# Patient Record
Sex: FEMALE | Race: WHITE | URBAN - METROPOLITAN AREA
[De-identification: names, ages, dates, MRNs, and addresses within clinical notes are randomized per-mention and may not be internally consistent; named-entity substitution may affect disease eponyms.]

---

## 2017-02-13 ENCOUNTER — INPATIENT (INPATIENT)
Facility: HOSPITAL | Age: 57
LOS: 0 days | Discharge: ANOTHER HOSPITAL | End: 2017-02-14
Attending: INTERNAL MEDICINE | Admitting: INTERNAL MEDICINE
Payer: MEDICAID

## 2017-02-13 VITALS
DIASTOLIC BLOOD PRESSURE: 112 MMHG | OXYGEN SATURATION: 99 % | WEIGHT: 79.81 LBS | HEIGHT: 63.78 IN | RESPIRATION RATE: 17 BRPM | SYSTOLIC BLOOD PRESSURE: 227 MMHG | TEMPERATURE: 99 F | HEART RATE: 78 BPM

## 2017-02-13 LAB
ALBUMIN SERPL ELPH-MCNC: 3.8 G/DL — SIGNIFICANT CHANGE UP (ref 3.3–5)
ALP SERPL-CCNC: 166 U/L — HIGH (ref 40–120)
ALT FLD-CCNC: 78 U/L — SIGNIFICANT CHANGE UP (ref 12–78)
ANION GAP SERPL CALC-SCNC: 10 MMOL/L — SIGNIFICANT CHANGE UP (ref 5–17)
APTT BLD: 31.3 SEC — SIGNIFICANT CHANGE UP (ref 27.5–37.4)
AST SERPL-CCNC: 38 U/L — HIGH (ref 15–37)
BASOPHILS # BLD AUTO: 0.1 K/UL — SIGNIFICANT CHANGE UP (ref 0–0.2)
BASOPHILS NFR BLD AUTO: 2.2 % — HIGH (ref 0–2)
BILIRUB SERPL-MCNC: 0.6 MG/DL — SIGNIFICANT CHANGE UP (ref 0.2–1.2)
BUN SERPL-MCNC: 11 MG/DL — SIGNIFICANT CHANGE UP (ref 7–23)
CALCIUM SERPL-MCNC: 8.4 MG/DL — LOW (ref 8.5–10.1)
CHLORIDE SERPL-SCNC: 106 MMOL/L — SIGNIFICANT CHANGE UP (ref 96–108)
CK MB BLD-MCNC: 2.1 % — SIGNIFICANT CHANGE UP (ref 0–3.5)
CK MB BLD-MCNC: 2.2 % — SIGNIFICANT CHANGE UP (ref 0–3.5)
CK MB CFR SERPL CALC: 3.1 NG/ML — SIGNIFICANT CHANGE UP (ref 0.5–3.6)
CK MB CFR SERPL CALC: 3.9 NG/ML — HIGH (ref 0.5–3.6)
CK SERPL-CCNC: 142 U/L — SIGNIFICANT CHANGE UP (ref 26–192)
CK SERPL-CCNC: 184 U/L — SIGNIFICANT CHANGE UP (ref 26–192)
CO2 SERPL-SCNC: 24 MMOL/L — SIGNIFICANT CHANGE UP (ref 22–31)
CREAT SERPL-MCNC: 0.65 MG/DL — SIGNIFICANT CHANGE UP (ref 0.5–1.3)
D DIMER BLD IA.RAPID-MCNC: <150 NG/ML DDU — SIGNIFICANT CHANGE UP
EOSINOPHIL # BLD AUTO: 0.1 K/UL — SIGNIFICANT CHANGE UP (ref 0–0.5)
EOSINOPHIL NFR BLD AUTO: 1.5 % — SIGNIFICANT CHANGE UP (ref 0–6)
GLUCOSE SERPL-MCNC: 120 MG/DL — HIGH (ref 70–99)
HCT VFR BLD CALC: 43.8 % — SIGNIFICANT CHANGE UP (ref 34.5–45)
HGB BLD-MCNC: 15.5 G/DL — SIGNIFICANT CHANGE UP (ref 11.5–15.5)
INR BLD: 1.06 RATIO — SIGNIFICANT CHANGE UP (ref 0.88–1.16)
LIDOCAIN IGE QN: 152 U/L — SIGNIFICANT CHANGE UP (ref 73–393)
LYMPHOCYTES # BLD AUTO: 1.3 K/UL — SIGNIFICANT CHANGE UP (ref 1–3.3)
LYMPHOCYTES # BLD AUTO: 19.4 % — SIGNIFICANT CHANGE UP (ref 13–44)
MCHC RBC-ENTMCNC: 29.7 PG — SIGNIFICANT CHANGE UP (ref 27–34)
MCHC RBC-ENTMCNC: 35.4 GM/DL — SIGNIFICANT CHANGE UP (ref 32–36)
MCV RBC AUTO: 83.7 FL — SIGNIFICANT CHANGE UP (ref 80–100)
MONOCYTES # BLD AUTO: 0.5 K/UL — SIGNIFICANT CHANGE UP (ref 0–0.9)
MONOCYTES NFR BLD AUTO: 7.3 % — SIGNIFICANT CHANGE UP (ref 2–14)
NEUTROPHILS # BLD AUTO: 4.7 K/UL — SIGNIFICANT CHANGE UP (ref 1.8–7.4)
NEUTROPHILS NFR BLD AUTO: 69.7 % — SIGNIFICANT CHANGE UP (ref 43–77)
PLATELET # BLD AUTO: 386 K/UL — SIGNIFICANT CHANGE UP (ref 150–400)
POTASSIUM SERPL-MCNC: 3.6 MMOL/L — SIGNIFICANT CHANGE UP (ref 3.5–5.3)
POTASSIUM SERPL-SCNC: 3.6 MMOL/L — SIGNIFICANT CHANGE UP (ref 3.5–5.3)
PROT SERPL-MCNC: 8 GM/DL — SIGNIFICANT CHANGE UP (ref 6–8.3)
PROTHROM AB SERPL-ACNC: 11.9 SEC — SIGNIFICANT CHANGE UP (ref 10–13.1)
RBC # BLD: 5.24 M/UL — HIGH (ref 3.8–5.2)
RBC # FLD: 11.8 % — SIGNIFICANT CHANGE UP (ref 11–15)
SODIUM SERPL-SCNC: 140 MMOL/L — SIGNIFICANT CHANGE UP (ref 135–145)
TROPONIN I SERPL-MCNC: 0.17 NG/ML — HIGH (ref 0.01–0.04)
TROPONIN I SERPL-MCNC: 0.26 NG/ML — HIGH (ref 0.01–0.04)
WBC # BLD: 6.7 K/UL — SIGNIFICANT CHANGE UP (ref 3.8–10.5)
WBC # FLD AUTO: 6.7 K/UL — SIGNIFICANT CHANGE UP (ref 3.8–10.5)

## 2017-02-13 PROCEDURE — 99223 1ST HOSP IP/OBS HIGH 75: CPT

## 2017-02-13 PROCEDURE — 99284 EMERGENCY DEPT VISIT MOD MDM: CPT

## 2017-02-13 PROCEDURE — 71010: CPT | Mod: 26

## 2017-02-13 RX ORDER — SODIUM CHLORIDE 9 MG/ML
3 INJECTION INTRAMUSCULAR; INTRAVENOUS; SUBCUTANEOUS ONCE
Qty: 0 | Refills: 0 | Status: COMPLETED | OUTPATIENT
Start: 2017-02-13 | End: 2017-02-13

## 2017-02-13 RX ORDER — HEPARIN SODIUM 5000 [USP'U]/ML
2700 INJECTION INTRAVENOUS; SUBCUTANEOUS EVERY 6 HOURS
Qty: 0 | Refills: 0 | Status: DISCONTINUED | OUTPATIENT
Start: 2017-02-13 | End: 2017-02-14

## 2017-02-13 RX ORDER — CLOPIDOGREL BISULFATE 75 MG/1
300 TABLET, FILM COATED ORAL ONCE
Qty: 0 | Refills: 0 | Status: COMPLETED | OUTPATIENT
Start: 2017-02-13 | End: 2017-02-13

## 2017-02-13 RX ORDER — ASPIRIN/CALCIUM CARB/MAGNESIUM 324 MG
325 TABLET ORAL ONCE
Qty: 0 | Refills: 0 | Status: COMPLETED | OUTPATIENT
Start: 2017-02-13 | End: 2017-02-13

## 2017-02-13 RX ORDER — HEPARIN SODIUM 5000 [USP'U]/ML
INJECTION INTRAVENOUS; SUBCUTANEOUS
Qty: 25000 | Refills: 0 | Status: DISCONTINUED | OUTPATIENT
Start: 2017-02-13 | End: 2017-02-14

## 2017-02-13 RX ORDER — HEPARIN SODIUM 5000 [USP'U]/ML
2700 INJECTION INTRAVENOUS; SUBCUTANEOUS ONCE
Qty: 0 | Refills: 0 | Status: COMPLETED | OUTPATIENT
Start: 2017-02-13 | End: 2017-02-13

## 2017-02-13 RX ADMIN — CLOPIDOGREL BISULFATE 300 MILLIGRAM(S): 75 TABLET, FILM COATED ORAL at 21:54

## 2017-02-13 RX ADMIN — HEPARIN SODIUM 500 UNIT(S)/HR: 5000 INJECTION INTRAVENOUS; SUBCUTANEOUS at 21:54

## 2017-02-13 RX ADMIN — Medication 0.1 MILLIGRAM(S): at 14:13

## 2017-02-13 RX ADMIN — SODIUM CHLORIDE 3 MILLILITER(S): 9 INJECTION INTRAMUSCULAR; INTRAVENOUS; SUBCUTANEOUS at 13:23

## 2017-02-13 RX ADMIN — HEPARIN SODIUM 2700 UNIT(S): 5000 INJECTION INTRAVENOUS; SUBCUTANEOUS at 21:54

## 2017-02-13 RX ADMIN — Medication 325 MILLIGRAM(S): at 13:25

## 2017-02-13 NOTE — ED PROVIDER NOTE - PROGRESS NOTE DETAILS
Pt endorsed by Dr. Lund present for eval of HTN & CP.  EKG remarkable for inv lateral leads, +trop 0.26.  Dr. Lund contacted [facility], pending call back for possible transfer.  Pt to receive heparin, ASA, plavix. Discussed w [facility], pt to be transferred in am for cath.  Will admit for further eval/mgmt until then. Discussed w Dr. Calixto, will handle transfer & cath.

## 2017-02-13 NOTE — ED PROVIDER NOTE - OBJECTIVE STATEMENT
palpitations and chest discomfort 1 day palpitations and chest discomfort 1 day,has not taken BP meds in few months

## 2017-02-13 NOTE — ED ADULT TRIAGE NOTE - CHIEF COMPLAINT QUOTE
sent from urgent care for hypertensive crisis, dizziness with vision changes  original went to urgent cramps for lower abdominal cramps.

## 2017-02-14 ENCOUNTER — INPATIENT (INPATIENT)
Facility: HOSPITAL | Age: 57
LOS: 0 days | Discharge: HOME | DRG: 282 | End: 2017-02-14
Attending: SPECIALIST | Admitting: SPECIALIST
Payer: MEDICAID

## 2017-02-14 VITALS
WEIGHT: 169.54 LBS | DIASTOLIC BLOOD PRESSURE: 94 MMHG | OXYGEN SATURATION: 98 % | RESPIRATION RATE: 18 BRPM | HEART RATE: 68 BPM | SYSTOLIC BLOOD PRESSURE: 178 MMHG | TEMPERATURE: 98 F | HEIGHT: 59.06 IN

## 2017-02-14 VITALS
DIASTOLIC BLOOD PRESSURE: 78 MMHG | TEMPERATURE: 99 F | OXYGEN SATURATION: 99 % | HEART RATE: 74 BPM | RESPIRATION RATE: 18 BRPM | SYSTOLIC BLOOD PRESSURE: 128 MMHG

## 2017-02-14 VITALS
TEMPERATURE: 98 F | RESPIRATION RATE: 17 BRPM | OXYGEN SATURATION: 99 % | HEART RATE: 65 BPM | DIASTOLIC BLOOD PRESSURE: 62 MMHG | SYSTOLIC BLOOD PRESSURE: 168 MMHG

## 2017-02-14 DIAGNOSIS — I10 ESSENTIAL (PRIMARY) HYPERTENSION: ICD-10-CM

## 2017-02-14 DIAGNOSIS — I21.4 NON-ST ELEVATION (NSTEMI) MYOCARDIAL INFARCTION: ICD-10-CM

## 2017-02-14 DIAGNOSIS — I20.9 ANGINA PECTORIS, UNSPECIFIED: ICD-10-CM

## 2017-02-14 LAB
ANION GAP SERPL CALC-SCNC: 10 MMOL/L — SIGNIFICANT CHANGE UP (ref 5–17)
APTT BLD: 34.3 SEC — SIGNIFICANT CHANGE UP (ref 27.5–37.4)
APTT BLD: 42.3 SEC — HIGH (ref 27.5–37.4)
BASOPHILS # BLD AUTO: 0.1 K/UL — SIGNIFICANT CHANGE UP (ref 0–0.2)
BASOPHILS NFR BLD AUTO: 1.4 % — SIGNIFICANT CHANGE UP (ref 0–2)
BUN SERPL-MCNC: 15 MG/DL — SIGNIFICANT CHANGE UP (ref 7–23)
CALCIUM SERPL-MCNC: 8.7 MG/DL — SIGNIFICANT CHANGE UP (ref 8.5–10.1)
CHLORIDE SERPL-SCNC: 108 MMOL/L — SIGNIFICANT CHANGE UP (ref 96–108)
CO2 SERPL-SCNC: 24 MMOL/L — SIGNIFICANT CHANGE UP (ref 22–31)
CREAT SERPL-MCNC: 0.68 MG/DL — SIGNIFICANT CHANGE UP (ref 0.5–1.3)
EOSINOPHIL # BLD AUTO: 0.2 K/UL — SIGNIFICANT CHANGE UP (ref 0–0.5)
EOSINOPHIL NFR BLD AUTO: 1.8 % — SIGNIFICANT CHANGE UP (ref 0–6)
GLUCOSE SERPL-MCNC: 107 MG/DL — HIGH (ref 70–99)
HCT VFR BLD CALC: 43.4 % — SIGNIFICANT CHANGE UP (ref 34.5–45)
HCT VFR BLD CALC: 43.6 % — SIGNIFICANT CHANGE UP (ref 34.5–45)
HGB BLD-MCNC: 15.1 G/DL — SIGNIFICANT CHANGE UP (ref 11.5–15.5)
HGB BLD-MCNC: 15.4 G/DL — SIGNIFICANT CHANGE UP (ref 11.5–15.5)
LYMPHOCYTES # BLD AUTO: 1.9 K/UL — SIGNIFICANT CHANGE UP (ref 1–3.3)
LYMPHOCYTES # BLD AUTO: 23.1 % — SIGNIFICANT CHANGE UP (ref 13–44)
MCHC RBC-ENTMCNC: 29.9 PG — SIGNIFICANT CHANGE UP (ref 27–34)
MCHC RBC-ENTMCNC: 30.5 PG — SIGNIFICANT CHANGE UP (ref 27–34)
MCHC RBC-ENTMCNC: 34.8 GM/DL — SIGNIFICANT CHANGE UP (ref 32–36)
MCHC RBC-ENTMCNC: 35.3 GM/DL — SIGNIFICANT CHANGE UP (ref 32–36)
MCV RBC AUTO: 85.9 FL — SIGNIFICANT CHANGE UP (ref 80–100)
MCV RBC AUTO: 86.4 FL — SIGNIFICANT CHANGE UP (ref 80–100)
MONOCYTES # BLD AUTO: 0.6 K/UL — SIGNIFICANT CHANGE UP (ref 0–0.9)
MONOCYTES NFR BLD AUTO: 7.5 % — SIGNIFICANT CHANGE UP (ref 2–14)
NEUTROPHILS # BLD AUTO: 5.6 K/UL — SIGNIFICANT CHANGE UP (ref 1.8–7.4)
NEUTROPHILS NFR BLD AUTO: 66.2 % — SIGNIFICANT CHANGE UP (ref 43–77)
PLATELET # BLD AUTO: 338 K/UL — SIGNIFICANT CHANGE UP (ref 150–400)
PLATELET # BLD AUTO: 354 K/UL — SIGNIFICANT CHANGE UP (ref 150–400)
POTASSIUM SERPL-MCNC: 3.7 MMOL/L — SIGNIFICANT CHANGE UP (ref 3.5–5.3)
POTASSIUM SERPL-SCNC: 3.7 MMOL/L — SIGNIFICANT CHANGE UP (ref 3.5–5.3)
RBC # BLD: 5.04 M/UL — SIGNIFICANT CHANGE UP (ref 3.8–5.2)
RBC # BLD: 5.05 M/UL — SIGNIFICANT CHANGE UP (ref 3.8–5.2)
RBC # FLD: 12.4 % — SIGNIFICANT CHANGE UP (ref 11–15)
RBC # FLD: 12.4 % — SIGNIFICANT CHANGE UP (ref 11–15)
SODIUM SERPL-SCNC: 142 MMOL/L — SIGNIFICANT CHANGE UP (ref 135–145)
TROPONIN I SERPL-MCNC: 0.1 NG/ML — HIGH (ref 0.01–0.04)
TROPONIN I SERPL-MCNC: 0.14 NG/ML — HIGH (ref 0.01–0.04)
WBC # BLD: 7.7 K/UL — SIGNIFICANT CHANGE UP (ref 3.8–10.5)
WBC # BLD: 8.4 K/UL — SIGNIFICANT CHANGE UP (ref 3.8–10.5)
WBC # FLD AUTO: 7.7 K/UL — SIGNIFICANT CHANGE UP (ref 3.8–10.5)
WBC # FLD AUTO: 8.4 K/UL — SIGNIFICANT CHANGE UP (ref 3.8–10.5)

## 2017-02-14 PROCEDURE — 93010 ELECTROCARDIOGRAM REPORT: CPT

## 2017-02-14 RX ORDER — ASPIRIN/CALCIUM CARB/MAGNESIUM 324 MG
81 TABLET ORAL DAILY
Qty: 0 | Refills: 0 | Status: DISCONTINUED | OUTPATIENT
Start: 2017-02-14 | End: 2017-02-14

## 2017-02-14 RX ORDER — ASPIRIN/CALCIUM CARB/MAGNESIUM 324 MG
1 TABLET ORAL
Qty: 30 | Refills: 0 | OUTPATIENT
Start: 2017-02-14 | End: 2017-03-16

## 2017-02-14 RX ORDER — ISOSORBIDE MONONITRATE 60 MG/1
1 TABLET, EXTENDED RELEASE ORAL
Qty: 30 | Refills: 0 | OUTPATIENT
Start: 2017-02-14 | End: 2017-03-16

## 2017-02-14 RX ORDER — ISOSORBIDE MONONITRATE 60 MG/1
30 TABLET, EXTENDED RELEASE ORAL DAILY
Qty: 0 | Refills: 0 | Status: DISCONTINUED | OUTPATIENT
Start: 2017-02-14 | End: 2017-02-14

## 2017-02-14 RX ORDER — PANTOPRAZOLE SODIUM 20 MG/1
40 TABLET, DELAYED RELEASE ORAL
Qty: 0 | Refills: 0 | Status: DISCONTINUED | OUTPATIENT
Start: 2017-02-14 | End: 2017-02-14

## 2017-02-14 RX ORDER — METOPROLOL TARTRATE 50 MG
1 TABLET ORAL
Qty: 0 | Refills: 0 | COMMUNITY

## 2017-02-14 RX ORDER — ASPIRIN/CALCIUM CARB/MAGNESIUM 324 MG
1 TABLET ORAL
Qty: 0 | Refills: 0 | COMMUNITY

## 2017-02-14 RX ORDER — CLOPIDOGREL BISULFATE 75 MG/1
75 TABLET, FILM COATED ORAL DAILY
Qty: 0 | Refills: 0 | Status: DISCONTINUED | OUTPATIENT
Start: 2017-02-14 | End: 2017-02-14

## 2017-02-14 RX ORDER — CLOPIDOGREL BISULFATE 75 MG/1
1 TABLET, FILM COATED ORAL
Qty: 0 | Refills: 0 | COMMUNITY

## 2017-02-14 RX ORDER — METOPROLOL TARTRATE 50 MG
25 TABLET ORAL
Qty: 0 | Refills: 0 | Status: DISCONTINUED | OUTPATIENT
Start: 2017-02-14 | End: 2017-02-14

## 2017-02-14 RX ORDER — PANTOPRAZOLE SODIUM 20 MG/1
1 TABLET, DELAYED RELEASE ORAL
Qty: 30 | Refills: 0 | OUTPATIENT
Start: 2017-02-14 | End: 2017-03-16

## 2017-02-14 RX ORDER — PANTOPRAZOLE SODIUM 20 MG/1
1 TABLET, DELAYED RELEASE ORAL
Qty: 0 | Refills: 0 | COMMUNITY

## 2017-02-14 RX ORDER — SIMVASTATIN 20 MG/1
1 TABLET, FILM COATED ORAL
Qty: 30 | Refills: 0 | OUTPATIENT
Start: 2017-02-14 | End: 2017-03-16

## 2017-02-14 RX ORDER — CLOPIDOGREL BISULFATE 75 MG/1
1 TABLET, FILM COATED ORAL
Qty: 30 | Refills: 0 | OUTPATIENT
Start: 2017-02-14 | End: 2017-03-16

## 2017-02-14 RX ORDER — HYDRALAZINE HCL 50 MG
5 TABLET ORAL ONCE
Qty: 0 | Refills: 0 | Status: COMPLETED | OUTPATIENT
Start: 2017-02-14 | End: 2017-02-14

## 2017-02-14 RX ORDER — INFLUENZA VIRUS VACCINE 15; 15; 15; 15 UG/.5ML; UG/.5ML; UG/.5ML; UG/.5ML
0.5 SUSPENSION INTRAMUSCULAR ONCE
Qty: 0 | Refills: 0 | Status: COMPLETED | OUTPATIENT
Start: 2017-02-14 | End: 2017-02-14

## 2017-02-14 RX ORDER — METOPROLOL TARTRATE 50 MG
1 TABLET ORAL
Qty: 60 | Refills: 0 | OUTPATIENT
Start: 2017-02-14 | End: 2017-03-16

## 2017-02-14 RX ORDER — INFLUENZA VIRUS VACCINE 15; 15; 15; 15 UG/.5ML; UG/.5ML; UG/.5ML; UG/.5ML
0.5 SUSPENSION INTRAMUSCULAR ONCE
Qty: 0 | Refills: 0 | Status: DISCONTINUED | OUTPATIENT
Start: 2017-02-14 | End: 2017-02-14

## 2017-02-14 RX ADMIN — Medication 5 MILLIGRAM(S): at 15:13

## 2017-02-14 RX ADMIN — ISOSORBIDE MONONITRATE 30 MILLIGRAM(S): 60 TABLET, EXTENDED RELEASE ORAL at 17:19

## 2017-02-14 RX ADMIN — HEPARIN SODIUM 2700 UNIT(S): 5000 INJECTION INTRAVENOUS; SUBCUTANEOUS at 04:01

## 2017-02-14 RX ADMIN — HEPARIN SODIUM 650 UNIT(S)/HR: 5000 INJECTION INTRAVENOUS; SUBCUTANEOUS at 03:59

## 2017-02-14 RX ADMIN — Medication 25 MILLIGRAM(S): at 17:19

## 2017-02-14 RX ADMIN — PANTOPRAZOLE SODIUM 40 MILLIGRAM(S): 20 TABLET, DELAYED RELEASE ORAL at 17:19

## 2017-02-14 RX ADMIN — INFLUENZA VIRUS VACCINE 0.5 MILLILITER(S): 15; 15; 15; 15 SUSPENSION INTRAMUSCULAR at 17:19

## 2017-02-14 RX ADMIN — Medication 81 MILLIGRAM(S): at 17:19

## 2017-02-14 RX ADMIN — CLOPIDOGREL BISULFATE 75 MILLIGRAM(S): 75 TABLET, FILM COATED ORAL at 17:19

## 2017-02-14 NOTE — DISCHARGE NOTE ADULT - FINDINGS/TREATMENT
EF 55%. Left Main normal. LAD and circ luminal with no flow limiting lesions. mRCA 60%. Medical managemet.

## 2017-02-14 NOTE — H&P ADULT. - HISTORY OF PRESENT ILLNESS
57 y/o hypertensive woman with poor compliance with medication, developed anterior chest discomfort with palpitations this afternoon. Pain is diffuse, aching, mild severity, non radiating with no aggravating or relieving factors. No SOB, nausea, vomiting or diaphoresis, no abdominal or back pain. Pt had troponin 0.26 and EKG with T wave inversions.  ED physician faxed EKG to [facility], case discussed, pt given ASA, plavix, heparin drip started, pt to be transferred in AM for cardiac cath, pt to be admitted at [facility] until transfer. ED physician discussed case with cardiologist Dr Miranda.

## 2017-02-14 NOTE — H&P ADULT. - NEGATIVE NEUROLOGICAL SYMPTOMS
no headache/no paresthesias/no loss of consciousness/no generalized seizures/no confusion/no vertigo/no transient paralysis/no syncope

## 2017-02-14 NOTE — H&P ADULT. - RS GEN PE MLT RESP DETAILS PC
clear to auscultation bilaterally/no rales/no rhonchi/airway patent/breath sounds equal/respirations non-labored/no wheezes/good air movement

## 2017-02-14 NOTE — DISCHARGE NOTE ADULT - PATIENT PORTAL LINK FT
“You can access the Follow[employer] Patient Portal, offered by [facility], by registering with the following website: [email or web address]”

## 2017-02-14 NOTE — H&P CARDIOLOGY - HISTORY OF PRESENT ILLNESS
57 y/o hypertensive woman admitted to [place] on 2/13  with approx 4 hours  exertional, non radiating, anterior chest discomfort with palpitations , shortness of breath, nausea, palpitations and diaphoresis. Currently on no home meds. Pain is diffuse, aching, mild severity, non radiating with no aggravating or relieving factors.  Peak troponin .264 on 2/13 @ 1330, to .102 2/14 1039 am. K 3.7, creat .68, BUN,15. On IV heparin drip. PTT 42.3, INR 1.06. Loaded with Plavix 300 mg PO and  on 2/13, clonidine x 1 for BP. Beta blockers initiated. transferred to [facility] as NSTEMI for cardiac cath.    CXR 2/13: No acute airspace disease.    [place]  # [id number]

## 2017-02-14 NOTE — DISCHARGE NOTE ADULT - CARE PROVIDER_API CALL
Lazaro Miranda (MD), Cardiovascular Disease; Internal Medicine  [address]  [place] [address]  Phone: [phone]  Fax: [phone]

## 2017-02-14 NOTE — H&P ADULT. - ASSESSMENT
60 y/o hypertensive woman with ACS, comfortable at present, to be transferred for cardiac catheterization this AM.

## 2017-02-14 NOTE — DISCHARGE NOTE ADULT - HOSPITAL COURSE
57 y/o hypertensive woman admitted to [place] on 2/13  with approx 4 hours  exertional, non radiating, anterior chest discomfort with palpitations , shortness of breath, nausea, palpitations and diaphoresis. Currently on no home meds. Pain is diffuse, aching, mild severity, non radiating with no aggravating or relieving factors.  Peak troponin .264 on 2/13 @ 1330, to .102 2/14 1039 am. K 3.7, creat .68, BUN,15. On IV heparin drip. PTT 42.3, INR 1.06. Loaded with Plavix 300 mg PO and  on 2/13, clonidine x 1 for BP. Beta blockers initiated. transferred to [facility] as NSTEMI for cardiac cath.    CXR 2/13: No acute airspace disease.    [place]  # [id number]    2/14: S/P diagnostic cath via RFA.  VENTRICLES: EF estimated was 55 %.  CORONARY VESSELS: The coronary circulation is right dominant.  LM:   --  LM: Normal.  LAD:   --  LAD: Angiography showed minor luminal irregularities with no  flow limiting lesions.  CX:   --  Circumflex: Angiography showed minor luminal irregularities with  no flow limiting lesions.  RCA:   --  Mid RCA: There was a 60 % stenosis.  COMPLICATIONS: There were no complications.  DIAGNOSTIC RECOMMENDATIONS: The patient's anti-anginal regimen should be  further intensified. Beta blockers are advised as part of the medical  therapy.  Prepared and signed by  Lazaro Lemons M.D.    Plan: discharge today. To        start Imdur, beta blocker, continue ASA and Plavix, start statin and protonix. To follow up with Dr lemons in office in 1 week. # [phone]. clinically stable and cleared for discharge as per Dr Lemons.

## 2017-02-14 NOTE — DISCHARGE NOTE ADULT - CARE PLAN
Principal Discharge DX:	Chest pain  Goal:	Patient will be free from pain  Instructions for follow-up, activity and diet:	To start Imdur, BB, ASA, Plavix, statin. Advance diet as tolerated. Follow up with Dr Miranda in office in 1 week  Secondary Diagnosis:	HLD (hyperlipidemia)  Goal:	Your LDL cholesterol will be less than 70mg/dL  Instructions for follow-up, activity and diet:	Continue with your cholesterol medications. Eat a heart healthy diet that is low in saturated fats and salt, and includes whole grains, fruits, vegetables and lean protein; exercise regularly (consult with your physician or cardiologist first); maintain a heart healthy weight; if you smoke - quit (A resource to help you stop smoking is the [facility] [employer] Control – phone number [phone].). Continue to follow with your primary physician or cardiologist.  Secondary Diagnosis:	Hypertension  Goal:	Your blood pressure will be controlled.  Instructions for follow-up, activity and diet:	Continue with your blood pressure medications; eat a heart healthy diet with low salt diet; exercise regularly (consult with your physician or cardiologist first); maintain a heart healthy weight; if you smoke - quit (A resource to help you stop smoking is the [facility] [employer] Control – phone number [phone].); include healthy ways to manage stress. Continue to follow with your primary care physician or cardiologist. Principal Discharge DX:	Chest pain  Goal:	Patient will be free from pain  Instructions for follow-up, activity and diet:	To start Imdur, BB, ASA, Plavix, statin. Advance diet as tolerated. Follow up with Dr Miranda in office in 1 week  Secondary Diagnosis:	HLD (hyperlipidemia)  Goal:	Your LDL cholesterol will be less than 70mg/dL  Instructions for follow-up, activity and diet:	Continue with your cholesterol medications. Eat a heart healthy diet that is low in saturated fats and salt, and includes whole grains, fruits, vegetables and lean protein; exercise regularly (consult with your physician or cardiologist first); maintain a heart healthy weight; if you smoke - quit (A resource to help you stop smoking is the [facility] [employer] Control – phone number [phone].). Continue to follow with your primary physician or cardiologist.  Secondary Diagnosis:	Hypertension  Goal:	Your blood pressure will be controlled.  Instructions for follow-up, activity and diet:	Continue with your blood pressure medications; eat a heart healthy diet with low salt diet; exercise regularly (consult with your physician or cardiologist first); maintain a heart healthy weight; if you smoke - quit (A resource to help you stop smoking is the [facility] [employer] Control – phone number [phone].); include healthy ways to manage stress. Continue to follow with your primary care physician or cardiologist. Principal Discharge DX:	Chest pain  Goal:	Patient will be free from pain  Instructions for follow-up, activity and diet:	To start Imdur, BB, ASA, Plavix, statin. Advance diet as tolerated. Follow up with Dr Miranda in office in 1 week  Secondary Diagnosis:	HLD (hyperlipidemia)  Goal:	Your LDL cholesterol will be less than 70mg/dL  Instructions for follow-up, activity and diet:	Continue with your cholesterol medications. Eat a heart healthy diet that is low in saturated fats and salt, and includes whole grains, fruits, vegetables and lean protein; exercise regularly (consult with your physician or cardiologist first); maintain a heart healthy weight; if you smoke - quit (A resource to help you stop smoking is the [facility] [employer] Control – phone number [phone].). Continue to follow with your primary physician or cardiologist.  Secondary Diagnosis:	Hypertension  Goal:	Your blood pressure will be controlled.  Instructions for follow-up, activity and diet:	Continue with your blood pressure medications; eat a heart healthy diet with low salt diet; exercise regularly (consult with your physician or cardiologist first); maintain a heart healthy weight; if you smoke - quit (A resource to help you stop smoking is the [facility] [employer] Control – phone number [phone].); include healthy ways to manage stress. Continue to follow with your primary care physician or cardiologist.

## 2017-02-14 NOTE — DISCHARGE NOTE ADULT - PLAN OF CARE
Patient will be free from pain To start Imdur, BB, ASA, Plavix, statin. Advance diet as tolerated. Follow up with Dr Miranda in office in 1 week Your LDL cholesterol will be less than 70mg/dL Continue with your cholesterol medications. Eat a heart healthy diet that is low in saturated fats and salt, and includes whole grains, fruits, vegetables and lean protein; exercise regularly (consult with your physician or cardiologist first); maintain a heart healthy weight; if you smoke - quit (A resource to help you stop smoking is the [facility] – phone number [phone].). Continue to follow with your primary physician or cardiologist. Your blood pressure will be controlled. Continue with your blood pressure medications; eat a heart healthy diet with low salt diet; exercise regularly (consult with your physician or cardiologist first); maintain a heart healthy weight; if you smoke - quit (A resource to help you stop smoking is the [facility] Control – phone number [phone].); include healthy ways to manage stress. Continue to follow with your primary care physician or cardiologist.

## 2017-02-14 NOTE — DISCHARGE NOTE ADULT - MEDICATION SUMMARY - MEDICATIONS TO TAKE
I will START or STAY ON the medications listed below when I get home from the hospital:    aspirin 81 mg oral tablet  -- 1 tab(s) by mouth once a day hosp MDD:once daily  -- Indication: For CAD    Imdur 30 mg oral tablet, extended release  -- 1 tab(s) by mouth once a day (in the morning)  -- Do not drink alcoholic beverages when taking this medication.  It is very important that you take or use this exactly as directed.  Do not skip doses or discontinue unless directed by your doctor.  Swallow whole.  Do not crush.    -- Indication: For antianginal    clopidogrel 75 mg oral tablet  -- 1 tab(s) by mouth once a day  -- Do not take aspirin or aspirin containing products without knowledge and consent of your physician.    -- Indication: For antiplatelet    Metoprolol Tartrate 25 mg oral tablet  -- 1 tab(s) by mouth 2 times a day  -- It is very important that you take or use this exactly as directed.  Do not skip doses or discontinue unless directed by your doctor.  May cause drowsiness.  Alcohol may intensify this effect.  Use care when operating dangerous machinery.  Some non-prescription drugs may aggravate your condition.  Read all labels carefully.  If a warning appears, check with your doctor before taking.  Take with food or milk.  This drug may impair the ability to drive or operate machinery.  Use care until you become familiar with its effects.    -- Indication: For HTN    pantoprazole 40 mg oral delayed release tablet  -- 1 tab(s) by mouth once a day  -- It is very important that you take or use this exactly as directed.  Do not skip doses or discontinue unless directed by your doctor.  Obtain medical advice before taking any non-prescription drugs as some may affect the action of this medication.  Swallow whole.  Do not crush.    -- Indication: For GERD

## 2017-02-14 NOTE — H&P ADULT. - NEUROLOGICAL DETAILS
deep reflexes intact/responds to verbal commands/cranial nerves intact/sensation intact/alert and oriented x 3

## 2017-02-14 NOTE — H&P ADULT. - PROBLEM SELECTOR PLAN 1
for cardiac cath, cardiology Dr Miranda, ASA, plavix, IV heparin, beta blocker, serial cardiac enzymes, telemetry

## 2017-02-14 NOTE — H&P ADULT. - MUSCULOSKELETAL
details… detailed exam normal strength/no joint erythema/no joint swelling/no calf tenderness/ROM intact

## 2017-02-16 DIAGNOSIS — I20.9 ANGINA PECTORIS, UNSPECIFIED: ICD-10-CM

## 2017-02-16 DIAGNOSIS — Z91.14 PATIENT'S OTHER NONCOMPLIANCE WITH MEDICATION REGIMEN: ICD-10-CM

## 2017-02-16 DIAGNOSIS — I10 ESSENTIAL (PRIMARY) HYPERTENSION: ICD-10-CM

## 2017-02-27 ENCOUNTER — INPATIENT (INPATIENT)
Facility: HOSPITAL | Age: 57
LOS: 2 days | Discharge: HOME | End: 2017-03-02
Attending: INTERNAL MEDICINE | Admitting: INTERNAL MEDICINE
Payer: MEDICAID

## 2017-02-27 VITALS
SYSTOLIC BLOOD PRESSURE: 190 MMHG | HEIGHT: 62 IN | OXYGEN SATURATION: 99 % | HEART RATE: 56 BPM | WEIGHT: 164.91 LBS | TEMPERATURE: 98 F | RESPIRATION RATE: 19 BRPM | DIASTOLIC BLOOD PRESSURE: 75 MMHG

## 2017-02-27 PROBLEM — I10 ESSENTIAL (PRIMARY) HYPERTENSION: Chronic | Status: ACTIVE | Noted: 2017-02-13

## 2017-02-27 LAB
ALBUMIN SERPL ELPH-MCNC: 3.9 G/DL — SIGNIFICANT CHANGE UP (ref 3.3–5)
ALP SERPL-CCNC: 170 U/L — HIGH (ref 40–120)
ALT FLD-CCNC: 55 U/L — SIGNIFICANT CHANGE UP (ref 12–78)
ANION GAP SERPL CALC-SCNC: 7 MMOL/L — SIGNIFICANT CHANGE UP (ref 5–17)
APTT BLD: 31.9 SEC — SIGNIFICANT CHANGE UP (ref 27.5–37.4)
AST SERPL-CCNC: 29 U/L — SIGNIFICANT CHANGE UP (ref 15–37)
BASOPHILS # BLD AUTO: 0.1 K/UL — SIGNIFICANT CHANGE UP (ref 0–0.2)
BASOPHILS NFR BLD AUTO: 1.2 % — SIGNIFICANT CHANGE UP (ref 0–2)
BILIRUB SERPL-MCNC: 0.5 MG/DL — SIGNIFICANT CHANGE UP (ref 0.2–1.2)
BUN SERPL-MCNC: 13 MG/DL — SIGNIFICANT CHANGE UP (ref 7–23)
CALCIUM SERPL-MCNC: 8.5 MG/DL — SIGNIFICANT CHANGE UP (ref 8.5–10.1)
CHLORIDE SERPL-SCNC: 107 MMOL/L — SIGNIFICANT CHANGE UP (ref 96–108)
CK MB BLD-MCNC: 1.9 % — SIGNIFICANT CHANGE UP (ref 0–3.5)
CK MB CFR SERPL CALC: 2 NG/ML — SIGNIFICANT CHANGE UP (ref 0.5–3.6)
CK SERPL-CCNC: 105 U/L — SIGNIFICANT CHANGE UP (ref 26–192)
CO2 SERPL-SCNC: 27 MMOL/L — SIGNIFICANT CHANGE UP (ref 22–31)
CREAT SERPL-MCNC: 0.68 MG/DL — SIGNIFICANT CHANGE UP (ref 0.5–1.3)
EOSINOPHIL # BLD AUTO: 0.2 K/UL — SIGNIFICANT CHANGE UP (ref 0–0.5)
EOSINOPHIL NFR BLD AUTO: 2 % — SIGNIFICANT CHANGE UP (ref 0–6)
GLUCOSE SERPL-MCNC: 105 MG/DL — HIGH (ref 70–99)
HCT VFR BLD CALC: 44.1 % — SIGNIFICANT CHANGE UP (ref 34.5–45)
HGB BLD-MCNC: 14.9 G/DL — SIGNIFICANT CHANGE UP (ref 11.5–15.5)
INR BLD: 0.99 RATIO — SIGNIFICANT CHANGE UP (ref 0.88–1.16)
LYMPHOCYTES # BLD AUTO: 1.5 K/UL — SIGNIFICANT CHANGE UP (ref 1–3.3)
LYMPHOCYTES # BLD AUTO: 20.1 % — SIGNIFICANT CHANGE UP (ref 13–44)
MCHC RBC-ENTMCNC: 29.4 PG — SIGNIFICANT CHANGE UP (ref 27–34)
MCHC RBC-ENTMCNC: 33.9 GM/DL — SIGNIFICANT CHANGE UP (ref 32–36)
MCV RBC AUTO: 86.7 FL — SIGNIFICANT CHANGE UP (ref 80–100)
MONOCYTES # BLD AUTO: 0.7 K/UL — SIGNIFICANT CHANGE UP (ref 0–0.9)
MONOCYTES NFR BLD AUTO: 9.1 % — SIGNIFICANT CHANGE UP (ref 2–14)
NEUTROPHILS # BLD AUTO: 5.1 K/UL — SIGNIFICANT CHANGE UP (ref 1.8–7.4)
NEUTROPHILS NFR BLD AUTO: 67.5 % — SIGNIFICANT CHANGE UP (ref 43–77)
NT-PROBNP SERPL-SCNC: 302 PG/ML — HIGH (ref 0–125)
PLATELET # BLD AUTO: 339 K/UL — SIGNIFICANT CHANGE UP (ref 150–400)
POTASSIUM SERPL-MCNC: 4.1 MMOL/L — SIGNIFICANT CHANGE UP (ref 3.5–5.3)
POTASSIUM SERPL-SCNC: 4.1 MMOL/L — SIGNIFICANT CHANGE UP (ref 3.5–5.3)
PROT SERPL-MCNC: 8.2 GM/DL — SIGNIFICANT CHANGE UP (ref 6–8.3)
PROTHROM AB SERPL-ACNC: 11.1 SEC — SIGNIFICANT CHANGE UP (ref 10–13.1)
RBC # BLD: 5.08 M/UL — SIGNIFICANT CHANGE UP (ref 3.8–5.2)
RBC # FLD: 13.2 % — SIGNIFICANT CHANGE UP (ref 11–15)
SODIUM SERPL-SCNC: 141 MMOL/L — SIGNIFICANT CHANGE UP (ref 135–145)
TROPONIN I SERPL-MCNC: 0.02 NG/ML — SIGNIFICANT CHANGE UP (ref 0.01–0.04)
WBC # BLD: 7.6 K/UL — SIGNIFICANT CHANGE UP (ref 3.8–10.5)
WBC # FLD AUTO: 7.6 K/UL — SIGNIFICANT CHANGE UP (ref 3.8–10.5)

## 2017-02-27 PROCEDURE — 99285 EMERGENCY DEPT VISIT HI MDM: CPT

## 2017-02-27 PROCEDURE — 71010: CPT | Mod: 26

## 2017-02-27 RX ORDER — METOPROLOL TARTRATE 50 MG
25 TABLET ORAL ONCE
Qty: 0 | Refills: 0 | Status: COMPLETED | OUTPATIENT
Start: 2017-02-27 | End: 2017-02-27

## 2017-02-27 RX ORDER — PANTOPRAZOLE SODIUM 20 MG/1
40 TABLET, DELAYED RELEASE ORAL
Qty: 0 | Refills: 0 | Status: DISCONTINUED | OUTPATIENT
Start: 2017-02-27 | End: 2017-03-02

## 2017-02-27 RX ORDER — ASPIRIN/CALCIUM CARB/MAGNESIUM 324 MG
81 TABLET ORAL DAILY
Qty: 0 | Refills: 0 | Status: DISCONTINUED | OUTPATIENT
Start: 2017-02-27 | End: 2017-02-27

## 2017-02-27 RX ORDER — SIMVASTATIN 20 MG/1
10 TABLET, FILM COATED ORAL AT BEDTIME
Qty: 0 | Refills: 0 | Status: DISCONTINUED | OUTPATIENT
Start: 2017-02-27 | End: 2017-03-02

## 2017-02-27 RX ORDER — ISOSORBIDE MONONITRATE 60 MG/1
30 TABLET, EXTENDED RELEASE ORAL DAILY
Qty: 0 | Refills: 0 | Status: DISCONTINUED | OUTPATIENT
Start: 2017-02-27 | End: 2017-02-28

## 2017-02-27 RX ORDER — METOPROLOL TARTRATE 50 MG
25 TABLET ORAL
Qty: 0 | Refills: 0 | Status: DISCONTINUED | OUTPATIENT
Start: 2017-02-27 | End: 2017-02-28

## 2017-02-27 RX ORDER — CLOPIDOGREL BISULFATE 75 MG/1
75 TABLET, FILM COATED ORAL DAILY
Qty: 0 | Refills: 0 | Status: DISCONTINUED | OUTPATIENT
Start: 2017-02-27 | End: 2017-03-02

## 2017-02-27 RX ORDER — ASPIRIN/CALCIUM CARB/MAGNESIUM 324 MG
325 TABLET ORAL DAILY
Qty: 0 | Refills: 0 | Status: DISCONTINUED | OUTPATIENT
Start: 2017-02-27 | End: 2017-03-02

## 2017-02-27 RX ADMIN — ISOSORBIDE MONONITRATE 30 MILLIGRAM(S): 60 TABLET, EXTENDED RELEASE ORAL at 22:04

## 2017-02-27 RX ADMIN — CLOPIDOGREL BISULFATE 75 MILLIGRAM(S): 75 TABLET, FILM COATED ORAL at 14:16

## 2017-02-27 RX ADMIN — Medication 25 MILLIGRAM(S): at 22:04

## 2017-02-27 RX ADMIN — PANTOPRAZOLE SODIUM 40 MILLIGRAM(S): 20 TABLET, DELAYED RELEASE ORAL at 22:05

## 2017-02-27 RX ADMIN — SIMVASTATIN 10 MILLIGRAM(S): 20 TABLET, FILM COATED ORAL at 22:03

## 2017-02-27 RX ADMIN — Medication 25 MILLIGRAM(S): at 14:16

## 2017-02-27 RX ADMIN — Medication 325 MILLIGRAM(S): at 14:15

## 2017-02-27 NOTE — ED ADULT NURSE NOTE - CAS TRG GENERAL NORM CIRC DET
No visible significant external bleeding/Strong peripheral pulses/Capillary refill less/equal to 2 seconds/Bounding peripheral pulses

## 2017-02-27 NOTE — ED PROVIDER NOTE - MEDICAL DECISION MAKING DETAILS
Patient with cad, s/p cardiac stents presents with chest pain from [facility]

## 2017-02-27 NOTE — ED ADULT NURSE NOTE - OBJECTIVE STATEMENT
alert and oriented x3 , presented to the c/o dizziness and headache , onset 2 weeks ago . recent placement of stent verbalized

## 2017-02-27 NOTE — H&P ADULT. - HISTORY OF PRESENT ILLNESS
55 yo  female with cad, htn s/p cardiac stents presents with chest pressure this morning. Patient unable to quantify chest pressure. Patient received cardiac cath two weeks ago with stents as per Dr. Miranda. Patient states that she is compliant with medications. Patient denies fever, chills, nausea, vomiting.

## 2017-02-27 NOTE — ED ADULT TRIAGE NOTE - CHIEF COMPLAINT QUOTE
pt states she was sent from clinic for high blood pressure. pt states she went to the clinic for check up. states she recently had cardiac catheterization. pt complaining of dizziness at triage. denies chest pain. blood pressure 190/75 at triage.

## 2017-02-27 NOTE — ED PROVIDER NOTE - OBJECTIVE STATEMENT
57 yo  female with cad, htn s/p cardiac stents presents with chest pressure this morning. Patient unable to quantify chest pressure. Patient received cardiac cath two weeks ago with stents as per Dr. Miranda. Patient states that she is compliant with medications. Patient denies fever, chills, nausea, vomiting.

## 2017-02-28 DIAGNOSIS — I25.118 ATHEROSCLEROTIC HEART DISEASE OF NATIVE CORONARY ARTERY WITH OTHER FORMS OF ANGINA PECTORIS: ICD-10-CM

## 2017-02-28 DIAGNOSIS — R07.89 OTHER CHEST PAIN: ICD-10-CM

## 2017-02-28 DIAGNOSIS — I10 ESSENTIAL (PRIMARY) HYPERTENSION: ICD-10-CM

## 2017-02-28 DIAGNOSIS — Z95.5 PRESENCE OF CORONARY ANGIOPLASTY IMPLANT AND GRAFT: ICD-10-CM

## 2017-02-28 DIAGNOSIS — I25.110 ATHEROSCLEROTIC HEART DISEASE OF NATIVE CORONARY ARTERY WITH UNSTABLE ANGINA PECTORIS: ICD-10-CM

## 2017-02-28 LAB
CK MB BLD-MCNC: 1.9 % — SIGNIFICANT CHANGE UP (ref 0–3.5)
CK MB CFR SERPL CALC: 1.5 NG/ML — SIGNIFICANT CHANGE UP (ref 0.5–3.6)
CK SERPL-CCNC: 78 U/L — SIGNIFICANT CHANGE UP (ref 26–192)
CK SERPL-CCNC: 80 U/L — SIGNIFICANT CHANGE UP (ref 26–192)
TROPONIN I SERPL-MCNC: 0.02 NG/ML — SIGNIFICANT CHANGE UP (ref 0.01–0.04)
TROPONIN I SERPL-MCNC: 0.02 NG/ML — SIGNIFICANT CHANGE UP (ref 0.01–0.04)
TROPONIN I SERPL-MCNC: 0.03 NG/ML — SIGNIFICANT CHANGE UP (ref 0.01–0.04)

## 2017-02-28 RX ORDER — LABETALOL HCL 100 MG
200 TABLET ORAL
Qty: 0 | Refills: 0 | Status: DISCONTINUED | OUTPATIENT
Start: 2017-02-28 | End: 2017-03-02

## 2017-02-28 RX ORDER — NITROGLYCERIN 6.5 MG
0.4 CAPSULE, EXTENDED RELEASE ORAL
Qty: 0 | Refills: 0 | Status: DISCONTINUED | OUTPATIENT
Start: 2017-02-28 | End: 2017-03-02

## 2017-02-28 RX ORDER — REGADENOSON 0.08 MG/ML
0.4 INJECTION, SOLUTION INTRAVENOUS ONCE
Qty: 0 | Refills: 0 | Status: COMPLETED | OUTPATIENT
Start: 2017-02-28 | End: 2017-03-01

## 2017-02-28 RX ORDER — ISOSORBIDE MONONITRATE 60 MG/1
120 TABLET, EXTENDED RELEASE ORAL DAILY
Qty: 0 | Refills: 0 | Status: DISCONTINUED | OUTPATIENT
Start: 2017-02-28 | End: 2017-03-02

## 2017-02-28 RX ORDER — LISINOPRIL 2.5 MG/1
20 TABLET ORAL DAILY
Qty: 0 | Refills: 0 | Status: DISCONTINUED | OUTPATIENT
Start: 2017-02-28 | End: 2017-03-02

## 2017-02-28 RX ADMIN — Medication 325 MILLIGRAM(S): at 11:06

## 2017-02-28 RX ADMIN — ISOSORBIDE MONONITRATE 120 MILLIGRAM(S): 60 TABLET, EXTENDED RELEASE ORAL at 18:01

## 2017-02-28 RX ADMIN — LISINOPRIL 20 MILLIGRAM(S): 2.5 TABLET ORAL at 18:01

## 2017-02-28 RX ADMIN — SIMVASTATIN 10 MILLIGRAM(S): 20 TABLET, FILM COATED ORAL at 22:42

## 2017-02-28 RX ADMIN — CLOPIDOGREL BISULFATE 75 MILLIGRAM(S): 75 TABLET, FILM COATED ORAL at 11:06

## 2017-02-28 RX ADMIN — Medication 25 MILLIGRAM(S): at 17:07

## 2017-02-28 RX ADMIN — PANTOPRAZOLE SODIUM 40 MILLIGRAM(S): 20 TABLET, DELAYED RELEASE ORAL at 05:34

## 2017-02-28 RX ADMIN — Medication 25 MILLIGRAM(S): at 05:34

## 2017-02-28 RX ADMIN — Medication 200 MILLIGRAM(S): at 18:01

## 2017-02-28 RX ADMIN — ISOSORBIDE MONONITRATE 30 MILLIGRAM(S): 60 TABLET, EXTENDED RELEASE ORAL at 11:06

## 2017-02-28 RX ADMIN — Medication 0.4 MILLIGRAM(S): at 17:00

## 2017-02-28 NOTE — PROGRESS NOTE ADULT - ASSESSMENT
57 yo Female admitted with Chest pain/angina with an acute episode of increased pain, and diaphoresis.   Resolution of pain with 1 dose of SL nitro.

## 2017-02-28 NOTE — PROGRESS NOTE ADULT - SUBJECTIVE AND OBJECTIVE BOX
Patient is a 56y old  Female who presents with a chief complaint of Complaint of chest pressure and HTN (27 Feb 2017 23:26)      INTERVAL HPI/OVERNIGHT EVENTS:  Called by LORI Lei to see patient who is c/o Chest pressure and pain. Patient states pain is located on the left side of her chest, feels like a "poking" pain. with tingling felt down the left arm. She states the pain lasts 15 min and then resolves on and off. She reports diaphoresis and feeling tired after pain resolves. She has been experiencing these symptoms since her coronary cath 2 weeks ago, however pain is worsening.       MEDICATIONS  (STANDING):  aspirin 325milliGRAM(s) Oral daily  clopidogrel Tablet 75milliGRAM(s) Oral daily  simvastatin 10milliGRAM(s) Oral at bedtime  pantoprazole    Tablet 40milliGRAM(s) Oral before breakfast  regadenoson Injectable 0.4milliGRAM(s) IV Push once  isosorbide   mononitrate ER Tablet (IMDUR) 120milliGRAM(s) Oral daily  lisinopril 20milliGRAM(s) Oral daily  labetalol 200milliGRAM(s) Oral two times a day    MEDICATIONS  (PRN):  nitroglycerin     SubLingual 0.4milliGRAM(s) SubLingual every 5 minutes PRN Chest Pain      Allergies    No Known Allergies    Vital Signs Last 24 Hrs  T(C): 36.8, Max: 37.1 (02-27 @ 21:14)  T(F): 98.2, Max: 98.8 (02-27 @ 21:14)  HR: 69 (51 - 71)  BP: 183/82 (131/70 - 190/105)  BP(mean): --  RR: 18 (14 - 18)  SpO2: 97% (97% - 99%)  I&O's Summary    I & Os for current day (as of 28 Feb 2017 17:24)  =============================================  IN: 660 ml / OUT: 0 ml / NET: 660 ml      PHYSICAL EXAM:  GENERAL: Diaphoretic, anxious  HEAD:  Atraumatic, Normocephalic  EYES: EOMI, PERRLA, conjunctiva and sclera clear  NECK: Supple, No JVD.  CHEST/LUNG: Producible pain on palpation of left side of chest. Clear to auscultation bilaterally; No rales, rhonchi, wheezing, or rubs  HEART: Regular rate and rhythm; Murmur noted.  ABDOMEN: Soft, Nontender, Nondistended  EXTREMITIES:  2+ Peripheral Pulses, No clubbing, cyanosis, or edema  SKIN: No rashes or lesions  NEURO:  Alert & Oriented X3.    LABS:                        14.9   7.6   )-----------( 339      ( 27 Feb 2017 14:09 )             44.1     27 Feb 2017 14:09    141    |  107    |  13     ----------------------------<  105    4.1     |  27     |  0.68     Ca    8.5        27 Feb 2017 14:09    TPro  8.2    /  Alb  3.9    /  TBili  0.5    /  DBili  x      /  AST  29     /  ALT  55     /  AlkPhos  170    27 Feb 2017 14:09    PT/INR - ( 27 Feb 2017 14:09 )   PT: 11.1 sec;   INR: 0.99 ratio         PTT - ( 27 Feb 2017 14:09 )  PTT:31.9 sec    CAPILLARY BLOOD GLUCOSE      Microbiology        RADIOLOGY & ADDITIONAL TESTS:    Imaging Personally Reviewed:  [ ] YES  [ ] NO    Consultant(s) Notes Reviewed:  [ X] YES  [ ] NO    Care Discussed with Consultants/Other Providers [X ] YES  [ ] NO

## 2017-02-28 NOTE — CONSULT NOTE ADULT - PROBLEM SELECTOR PROBLEM 1
Coronary artery disease with unstable angina pectoris, unspecified vessel or lesion type, unspecified whether native or transplanted heart

## 2017-02-28 NOTE — CONSULT NOTE ADULT - SUBJECTIVE AND OBJECTIVE BOX
Patient is a 56y old  Female who presents with a chief complaint of Complaint of chest pressure and HTN (2017 23:26) angina        PAST MEDICAL & SURGICAL HISTORY:  Stented coronary artery  CAD (coronary artery disease)  Hypertension  No significant past surgical history      Summary of admission HPI: angina s/p cardiac cath 2weeks ago found to have RCA lesion on medical management.                PREVIOUS DIAGNOSTIC TESTING:      ECHO  FINDINGS:    STRESS  FINDINGS:    CATHETERIZATION  FINDINGS:    ELECTROPHYSIOLOGY STUDY  FINDINGS:    CAROTID ULTRASOUND:  FINDINGS    VENOUS DUPLEX SCAN:  FINDINGS:    CHEST CT PULMONARY ANGIO with IV Contrast:  FINDINGS:  MEDICATIONS  (STANDING):  aspirin 325milliGRAM(s) Oral daily  clopidogrel Tablet 75milliGRAM(s) Oral daily  isosorbide   mononitrate ER Tablet (IMDUR) 30milliGRAM(s) Oral daily  simvastatin 10milliGRAM(s) Oral at bedtime  metoprolol 25milliGRAM(s) Oral two times a day  pantoprazole    Tablet 40milliGRAM(s) Oral before breakfast  regadenoson Injectable 0.4milliGRAM(s) IV Push once    MEDICATIONS  (PRN):      FAMILY HISTORY:  No pertinent family history in first degree relatives      SOCIAL HISTORY:    CIGARETTES:    ALCOHOL:    REVIEW OF SYSTEMS:    CONSTITUTIONAL: No fever, weight loss, chills, shakes, or fatigue  EYES: No eye pain, visual disturbances, or discharge  ENMT:  No difficulty hearing, tinnitus, vertigo; No sinus or throat pain  NECK: No pain or stiffness  BREASTS: No pain, masses, or nipple discharge  RESPIRATORY: No cough, wheezing, hemoptysis, or shortness of breath  CARDIOVASCULAR: No chest pain, dyspnea, palpitations, dizziness, syncope, paroxysmal nocturnal dyspnea, orthopnea, or arm or leg swelling  GASTROINTESTINAL: No abdominal  or epigastric pain, nausea, vomiting, hematemesis, diarrhea, constipation, melena or bright red blood.  GENITOURINARY: No dysuria, nocturia, hematuria, or urinary incontinence  NEUROLOGICAL: No headaches, memory loss, slurred speech, limb weakness, loss of strength, numbness, or tremors  SKIN: No itching, burning, rashes, or lesions   LYMPH NODES: No enlarged glands  ENDOCRINE: No heat or cold intolerance, or hair loss  MUSCULOSKELETAL: No joint pain or swelling, muscle, back, or extremity pain  PSYCHIATRIC: No depression, anxiety, or difficulty sleeping  HEME/LYMPH: No easy bruising or bleeding gums  ALLERY AND IMMUNOLOGIC: No hives or rash.      Vital Signs Last 24 Hrs  T(C): 36.8, Max: 37.1 ( @ 21:14)  T(F): 98.2, Max: 98.8 ( @ 21:14)  HR: 58 (51 - 68)  BP: 160/68 (131/70 - 190/105)  BP(mean): --  RR: 18 (14 - 18)  SpO2: 99% (97% - 99%)    PHYSICAL EXAM:    GENERAL: In no apparent distress, well nourished, and hydrated.  HEAD:  Atraumatic, Normocephalic  EYES: EOMI, PERRLA, conjunctiva and sclera clear  ENMT: No tonsillar erythema, exudates, or enlargement; Moist mucous membranes, Good dentition, No lesions  NECK: Supple and normal thyroid.  No JVD or carotid bruit.  Carotid pulse is 2+ bilaterally.  HEART: Regular rate and rhythm; No murmurs, rubs, or gallops.  PULMONARY: Clear to auscultation and perfusion.  No rales, wheezing, or rhonchi bilaterally.  ABDOMEN: Soft, Nontender, Nondistended; Bowel sounds present  EXTREMITIES:  2+ Peripheral Pulses, No clubbing, cyanosis, or edema  LYMPH: No lymphadenopathy noted  NEUROLOGICAL: Grossly nonfocal          INTERPRETATION OF TELEMETRY:    ECG:    I&O's Detail    I & Os for current day (as of 2017 13:21)  =============================================  IN:    Oral Fluid: 300 ml    Total IN: 300 ml  ---------------------------------------------  OUT:    Total OUT: 0 ml  ---------------------------------------------  Total NET: 300 ml      LABS:                        14.9   7.6   )-----------( 339      ( 2017 14:09 )             44.1     2017 14:09    141    |  107    |  13     ----------------------------<  105    4.1     |  27     |  0.68     Ca    8.5        2017 14:09    TPro  8.2    /  Alb  3.9    /  TBili  0.5    /  DBili  x      /  AST  29     /  ALT  55     /  AlkPhos  170    2017 14:09    CARDIAC MARKERS ( 2017 06:59 )  .025 ng/mL / x     / x     / x     / x      CARDIAC MARKERS ( 2017 01:05 )  .024 ng/mL / x     / 78 U/L / x     / 1.5 ng/mL  CARDIAC MARKERS ( 2017 14:09 )  .022 ng/mL / x     / 105 U/L / x     / 2.0 ng/mL      PT/INR - ( 2017 14:09 )   PT: 11.1 sec;   INR: 0.99 ratio         PTT - ( 2017 14:09 )  PTT:31.9 sec    BNPSerum Pro-Brain Natriuretic Peptide: 302 pg/mL ( @ 14:09)    I&O's Detail    I & Os for current day (as of 2017 13:21)  =============================================  IN:    Oral Fluid: 300 ml    Total IN: 300 ml  ---------------------------------------------  OUT:    Total OUT: 0 ml  ---------------------------------------------  Total NET: 300 ml    Daily Height in cm: 157.48 (2017 23:22)    Daily Weight in k.3 (2017 23:22)    RADIOLOGY & ADDITIONAL STUDIES:

## 2017-03-01 PROCEDURE — 78452 HT MUSCLE IMAGE SPECT MULT: CPT | Mod: 26

## 2017-03-01 RX ADMIN — CLOPIDOGREL BISULFATE 75 MILLIGRAM(S): 75 TABLET, FILM COATED ORAL at 12:43

## 2017-03-01 RX ADMIN — SIMVASTATIN 10 MILLIGRAM(S): 20 TABLET, FILM COATED ORAL at 21:36

## 2017-03-01 RX ADMIN — REGADENOSON 0.4 MILLIGRAM(S): 0.08 INJECTION, SOLUTION INTRAVENOUS at 10:56

## 2017-03-01 RX ADMIN — Medication 325 MILLIGRAM(S): at 12:43

## 2017-03-01 RX ADMIN — Medication 200 MILLIGRAM(S): at 18:22

## 2017-03-01 RX ADMIN — ISOSORBIDE MONONITRATE 120 MILLIGRAM(S): 60 TABLET, EXTENDED RELEASE ORAL at 13:32

## 2017-03-01 RX ADMIN — PANTOPRAZOLE SODIUM 40 MILLIGRAM(S): 20 TABLET, DELAYED RELEASE ORAL at 06:02

## 2017-03-01 NOTE — PROGRESS NOTE ADULT - SUBJECTIVE AND OBJECTIVE BOX
SUBJECTIVE AND OBJECTIVE:    INTERVAL HPI/OVERNIGHT EVENTS:    Undergoing stress test.    MEDICATIONS  (STANDING):  aspirin 325milliGRAM(s) Oral daily  clopidogrel Tablet 75milliGRAM(s) Oral daily  simvastatin 10milliGRAM(s) Oral at bedtime  pantoprazole    Tablet 40milliGRAM(s) Oral before breakfast  isosorbide   mononitrate ER Tablet (IMDUR) 120milliGRAM(s) Oral daily  lisinopril 20milliGRAM(s) Oral daily  labetalol 200milliGRAM(s) Oral two times a day    MEDICATIONS  (PRN):  nitroglycerin     SubLingual 0.4milliGRAM(s) SubLingual every 5 minutes PRN Chest Pain      Allergies    No Known Allergies    Intolerances        REVIEW OF SYSTEMS:  CONSTITUTIONAL: No fever, weight loss, or fatigue  EYES: No eye pain, visual disturbances, or discharge  ENMT:  No difficulty hearing, tinnitus, vertigo; No sinus or throat pain  NECK: No pain or stiffness  BREASTS: No pain, masses, or nipple discharge  RESPIRATORY: No cough, wheezing, chills or hemoptysis; No shortness of breath  CARDIOVASCULAR: No chest pain, palpitations, dizziness, or leg swelling  GASTROINTESTINAL: No abdominal or epigastric pain. No nausea, vomiting, or hematemesis; No diarrhea or constipation. No melena or hematochezia.  GENITOURINARY: No dysuria, frequency, hematuria, or incontinence  NEUROLOGICAL: No headaches, memory loss, loss of strength, numbness, or tremors  SKIN: No itching, burning, rashes, or lesions   LYMPH NODES: No enlarged glands  ENDOCRINE: No heat or cold intolerance; No hair loss  MUSCULOSKELETAL: No joint pain or swelling; No muscle, back, or extremity pain  PSYCHIATRIC: No depression, anxiety, mood swings, or difficulty sleeping  HEME/LYMPH: No easy bruising, or bleeding gums  ALLERGY AND IMMUNOLOGIC: No hives or eczema      Vital Signs Last 24 Hrs  T(C): 37.1, Max: 37.1 (03-01 @ 05:06)  T(F): 98.8, Max: 98.8 (03-01 @ 05:06)  HR: 62 (60 - 71)  BP: 113/40 (101/52 - 188/102)  BP(mean): --  RR: 18 (18 - 18)  SpO2: 100% (97% - 100%)    PHYSICAL EXAM  General: Alert/oriented x 3, not distressed  Head: Atraumatic, normocephalic  Eyes: LEROY  Neck: No GOITER, NO jvd, no carotid bruit, trachea central  Chest: symmetrical, lunggs clear to percussion and auscultation  Abdomen: soft, non tender, no mass, bowel sounds normal, hernial orifices intact  External genitalia: normal  CNS: NO DEFICIT  Extremity: no deformity, peripheral pulses intact  Skin: no rash no erythema, no ulcer  LABS:                        14.9   7.6   )-----------( 339      ( 27 Feb 2017 14:09 )             44.1     27 Feb 2017 14:09    141    |  107    |  13     ----------------------------<  105    4.1     |  27     |  0.68     Ca    8.5        27 Feb 2017 14:09    TPro  8.2    /  Alb  3.9    /  TBili  0.5    /  DBili  x      /  AST  29     /  ALT  55     /  AlkPhos  170    27 Feb 2017 14:09    PT/INR - ( 27 Feb 2017 14:09 )   PT: 11.1 sec;   INR: 0.99 ratio         PTT - ( 27 Feb 2017 14:09 )  PTT:31.9 sec      RADIOLOGY & ADDITIONAL TESTS:

## 2017-03-01 NOTE — PROGRESS NOTE ADULT - SUBJECTIVE AND OBJECTIVE BOX
Patient is a 56y old  Female who presents with a chief complaint of Complaint of chest pressure and HTN (27 Feb 2017 23:26) CP        PAST MEDICAL & SURGICAL HISTORY:  Stented coronary artery  CAD (coronary artery disease)  Hypertension  No significant past surgical history      Summary of admission HPI: angina                PREVIOUS DIAGNOSTIC TESTING:      ECHO  FINDINGS:    STRESS  FINDINGS:    CATHETERIZATION  FINDINGS:    ELECTROPHYSIOLOGY STUDY  FINDINGS:    CAROTID ULTRASOUND:  FINDINGS    VENOUS DUPLEX SCAN:  FINDINGS:    CHEST CT PULMONARY ANGIO with IV Contrast:  FINDINGS:  MEDICATIONS  (STANDING):  aspirin 325milliGRAM(s) Oral daily  clopidogrel Tablet 75milliGRAM(s) Oral daily  simvastatin 10milliGRAM(s) Oral at bedtime  pantoprazole    Tablet 40milliGRAM(s) Oral before breakfast  isosorbide   mononitrate ER Tablet (IMDUR) 120milliGRAM(s) Oral daily  lisinopril 20milliGRAM(s) Oral daily  labetalol 200milliGRAM(s) Oral two times a day    MEDICATIONS  (PRN):  nitroglycerin     SubLingual 0.4milliGRAM(s) SubLingual every 5 minutes PRN Chest Pain      FAMILY HISTORY:  No pertinent family history in first degree relatives      SOCIAL HISTORY:    CIGARETTES:    ALCOHOL:    REVIEW OF SYSTEMS:    CONSTITUTIONAL: No fever, weight loss, chills, shakes, or fatigue  EYES: No eye pain, visual disturbances, or discharge  ENMT:  No difficulty hearing, tinnitus, vertigo; No sinus or throat pain  NECK: No pain or stiffness  BREASTS: No pain, masses, or nipple discharge  RESPIRATORY: No cough, wheezing, hemoptysis, or shortness of breath  CARDIOVASCULAR: No chest pain, dyspnea, palpitations, dizziness, syncope, paroxysmal nocturnal dyspnea, orthopnea, or arm or leg swelling  GASTROINTESTINAL: No abdominal  or epigastric pain, nausea, vomiting, hematemesis, diarrhea, constipation, melena or bright red blood.  GENITOURINARY: No dysuria, nocturia, hematuria, or urinary incontinence  NEUROLOGICAL: No headaches, memory loss, slurred speech, limb weakness, loss of strength, numbness, or tremors  SKIN: No itching, burning, rashes, or lesions   LYMPH NODES: No enlarged glands  ENDOCRINE: No heat or cold intolerance, or hair loss  MUSCULOSKELETAL: No joint pain or swelling, muscle, back, or extremity pain  PSYCHIATRIC: No depression, anxiety, or difficulty sleeping  HEME/LYMPH: No easy bruising or bleeding gums  ALLERY AND IMMUNOLOGIC: No hives or rash.      Vital Signs Last 24 Hrs  T(C): 37.3, Max: 37.3 (03-01 @ 16:33)  T(F): 99.2, Max: 99.2 (03-01 @ 16:33)  HR: 58 (58 - 62)  BP: 138/59 (101/52 - 138/59)  BP(mean): --  RR: 18 (18 - 18)  SpO2: 98% (96% - 100%)    PHYSICAL EXAM:    GENERAL: In no apparent distress, well nourished, and hydrated.  HEAD:  Atraumatic, Normocephalic  EYES: EOMI, PERRLA, conjunctiva and sclera clear  ENMT: No tonsillar erythema, exudates, or enlargement; Moist mucous membranes, Good dentition, No lesions  NECK: Supple and normal thyroid.  No JVD or carotid bruit.  Carotid pulse is 2+ bilaterally.  HEART: Regular rate and rhythm; No murmurs, rubs, or gallops.  PULMONARY: Clear to auscultation and perfusion.  No rales, wheezing, or rhonchi bilaterally.  ABDOMEN: Soft, Nontender, Nondistended; Bowel sounds present  EXTREMITIES:  2+ Peripheral Pulses, No clubbing, cyanosis, or edema  LYMPH: No lymphadenopathy noted  NEUROLOGICAL: Grossly nonfocal          INTERPRETATION OF TELEMETRY:    ECG:    I&O's Detail  I & Os for 24h ending 01 Mar 2017 07:00  =============================================  IN:    Oral Fluid: 1260 ml    Total IN: 1260 ml  ---------------------------------------------  OUT:    Total OUT: 0 ml  ---------------------------------------------  Total NET: 1260 ml    I & Os for current day (as of 01 Mar 2017 19:45)  =============================================  IN:    Oral Fluid: 480 ml    Total IN: 480 ml  ---------------------------------------------  OUT:    Total OUT: 0 ml  ---------------------------------------------  Total NET: 480 ml      LABS:          CARDIAC MARKERS ( 28 Feb 2017 17:24 )  .016 ng/mL / x     / 80 U/L / x     / x      CARDIAC MARKERS ( 28 Feb 2017 06:59 )  .025 ng/mL / x     / x     / x     / x      CARDIAC MARKERS ( 28 Feb 2017 01:05 )  .024 ng/mL / x     / 78 U/L / x     / 1.5 ng/mL          BNP  I&O's Detail  I & Os for 24h ending 01 Mar 2017 07:00  =============================================  IN:    Oral Fluid: 1260 ml    Total IN: 1260 ml  ---------------------------------------------  OUT:    Total OUT: 0 ml  ---------------------------------------------  Total NET: 1260 ml    I & Os for current day (as of 01 Mar 2017 19:45)  =============================================  IN:    Oral Fluid: 480 ml    Total IN: 480 ml  ---------------------------------------------  OUT:    Total OUT: 0 ml  ---------------------------------------------  Total NET: 480 ml    Daily     Daily     RADIOLOGY & ADDITIONAL STUDIES:

## 2017-03-02 VITALS
OXYGEN SATURATION: 97 % | HEART RATE: 62 BPM | RESPIRATION RATE: 16 BRPM | SYSTOLIC BLOOD PRESSURE: 120 MMHG | TEMPERATURE: 99 F | DIASTOLIC BLOOD PRESSURE: 75 MMHG

## 2017-03-02 RX ORDER — LISINOPRIL 2.5 MG/1
1 TABLET ORAL
Qty: 30 | Refills: 0 | OUTPATIENT
Start: 2017-03-02 | End: 2017-04-01

## 2017-03-02 RX ORDER — LABETALOL HCL 100 MG
1 TABLET ORAL
Qty: 60 | Refills: 0 | OUTPATIENT
Start: 2017-03-02 | End: 2017-04-01

## 2017-03-02 RX ADMIN — LISINOPRIL 20 MILLIGRAM(S): 2.5 TABLET ORAL at 06:18

## 2017-03-02 RX ADMIN — Medication 325 MILLIGRAM(S): at 12:23

## 2017-03-02 RX ADMIN — ISOSORBIDE MONONITRATE 120 MILLIGRAM(S): 60 TABLET, EXTENDED RELEASE ORAL at 12:23

## 2017-03-02 RX ADMIN — PANTOPRAZOLE SODIUM 40 MILLIGRAM(S): 20 TABLET, DELAYED RELEASE ORAL at 06:18

## 2017-03-02 RX ADMIN — CLOPIDOGREL BISULFATE 75 MILLIGRAM(S): 75 TABLET, FILM COATED ORAL at 12:23

## 2017-03-02 NOTE — DISCHARGE NOTE ADULT - PATIENT PORTAL LINK FT
“You can access the Follow[employer] Patient Portal, offered by [facility], by registering with the following website: [email or web address]”

## 2017-03-02 NOTE — PROGRESS NOTE ADULT - PROBLEM SELECTOR PROBLEM 2
Coronary artery disease with other form of angina pectoris
Hypertension

## 2017-03-02 NOTE — DISCHARGE NOTE ADULT - HOSPITAL COURSE
57 yo  female with cad, htn s/p cardiac stents presents with chest pressure this morning. Patient unable to quantify chest pressure. Patient received cardiac cath two weeks ago with stents as per Dr. Miranda. Patient states that she is compliant with medications. Patient denies fever, chills, nausea, vomiting.  Stress test negative.

## 2017-03-02 NOTE — PROGRESS NOTE ADULT - PROBLEM SELECTOR PLAN 1
Cardiology evaluation, pain management
medical management negative stress test . Discharge plan as per PMD.
nitrates.
-Cardiac enzymes, Troponin Stat  -EKG stat  -1 sublingual nitro given  -Discussed with Dr. Miranda

## 2017-03-02 NOTE — PROGRESS NOTE ADULT - PROBLEM SELECTOR PLAN 2
continue current medications
medical management.
stress test negative for ischemia
- Metoprolol dose given  -Re assess vitals in 1 hour  - Discussed with Dr. Ruth wolfing medication adjustments.

## 2017-03-02 NOTE — DISCHARGE NOTE ADULT - CARE PLAN
Principal Discharge DX:	Coronary artery disease with other form of angina pectoris  Goal:	stress test negative .  continue current medications.  Instructions for follow-up, activity and diet:	2 G Na diet  Secondary Diagnosis:	Essential hypertension  Secondary Diagnosis:	Stented coronary artery

## 2017-03-02 NOTE — PROGRESS NOTE ADULT - SUBJECTIVE AND OBJECTIVE BOX
Patient is a 56y old  Female who presents with a chief complaint of Complaint of chest pressure and HTN (27 Feb 2017 23:26)        PAST MEDICAL & SURGICAL HISTORY:  Stented coronary artery  CAD (coronary artery disease)  Hypertension  No significant past surgical history      Summary of admission HPI: angina and HTN                 PREVIOUS DIAGNOSTIC TESTING:      ECHO  FINDINGS:    STRESS  FINDINGS:    CATHETERIZATION  FINDINGS:    ELECTROPHYSIOLOGY STUDY  FINDINGS:    CAROTID ULTRASOUND:  FINDINGS    VENOUS DUPLEX SCAN:  FINDINGS:    CHEST CT PULMONARY ANGIO with IV Contrast:  FINDINGS:  MEDICATIONS  (STANDING):  aspirin 325milliGRAM(s) Oral daily  clopidogrel Tablet 75milliGRAM(s) Oral daily  simvastatin 10milliGRAM(s) Oral at bedtime  pantoprazole    Tablet 40milliGRAM(s) Oral before breakfast  isosorbide   mononitrate ER Tablet (IMDUR) 120milliGRAM(s) Oral daily  lisinopril 20milliGRAM(s) Oral daily  labetalol 200milliGRAM(s) Oral two times a day    MEDICATIONS  (PRN):  nitroglycerin     SubLingual 0.4milliGRAM(s) SubLingual every 5 minutes PRN Chest Pain      FAMILY HISTORY:  No pertinent family history in first degree relatives      SOCIAL HISTORY:    CIGARETTES:    ALCOHOL:    REVIEW OF SYSTEMS:    CONSTITUTIONAL: No fever, weight loss, chills, shakes, or fatigue  EYES: No eye pain, visual disturbances, or discharge  ENMT:  No difficulty hearing, tinnitus, vertigo; No sinus or throat pain  NECK: No pain or stiffness  BREASTS: No pain, masses, or nipple discharge  RESPIRATORY: No cough, wheezing, hemoptysis, or shortness of breath  CARDIOVASCULAR: No chest pain, dyspnea, palpitations, dizziness, syncope, paroxysmal nocturnal dyspnea, orthopnea, or arm or leg swelling  GASTROINTESTINAL: No abdominal  or epigastric pain, nausea, vomiting, hematemesis, diarrhea, constipation, melena or bright red blood.  GENITOURINARY: No dysuria, nocturia, hematuria, or urinary incontinence  NEUROLOGICAL: No headaches, memory loss, slurred speech, limb weakness, loss of strength, numbness, or tremors  SKIN: No itching, burning, rashes, or lesions   LYMPH NODES: No enlarged glands  ENDOCRINE: No heat or cold intolerance, or hair loss  MUSCULOSKELETAL: No joint pain or swelling, muscle, back, or extremity pain  PSYCHIATRIC: No depression, anxiety, or difficulty sleeping  HEME/LYMPH: No easy bruising or bleeding gums  ALLERY AND IMMUNOLOGIC: No hives or rash.      Vital Signs Last 24 Hrs  T(C): 36.6, Max: 37.3 (03-01 @ 16:33)  T(F): 97.8, Max: 99.2 (03-01 @ 16:33)  HR: 59 (58 - 63)  BP: 134/68 (112/54 - 138/59)  BP(mean): --  RR: 20 (16 - 20)  SpO2: 99% (96% - 99%)    PHYSICAL EXAM:    GENERAL: In no apparent distress, well nourished, and hydrated.  HEAD:  Atraumatic, Normocephalic  EYES: EOMI, PERRLA, conjunctiva and sclera clear  ENMT: No tonsillar erythema, exudates, or enlargement; Moist mucous membranes, Good dentition, No lesions  NECK: Supple and normal thyroid.  No JVD or carotid bruit.  Carotid pulse is 2+ bilaterally.  HEART: Regular rate and rhythm; No murmurs, rubs, or gallops.  PULMONARY: Clear to auscultation and perfusion.  No rales, wheezing, or rhonchi bilaterally.  ABDOMEN: Soft, Nontender, Nondistended; Bowel sounds present  EXTREMITIES:  2+ Peripheral Pulses, No clubbing, cyanosis, or edema  LYMPH: No lymphadenopathy noted  NEUROLOGICAL: Grossly nonfocal          INTERPRETATION OF TELEMETRY:    ECG:    I&O's Detail  I & Os for 24h ending 02 Mar 2017 07:00  =============================================  IN:    Oral Fluid: 840 ml    Total IN: 840 ml  ---------------------------------------------  OUT:    Total OUT: 0 ml  ---------------------------------------------  Total NET: 840 ml    I & Os for current day (as of 02 Mar 2017 11:05)  =============================================  IN:    Oral Fluid: 300 ml    Total IN: 300 ml  ---------------------------------------------  OUT:    Total OUT: 0 ml  ---------------------------------------------  Total NET: 300 ml      LABS:          CARDIAC MARKERS ( 28 Feb 2017 17:24 )  .016 ng/mL / x     / 80 U/L / x     / x              BNP  I&O's Detail  I & Os for 24h ending 02 Mar 2017 07:00  =============================================  IN:    Oral Fluid: 840 ml    Total IN: 840 ml  ---------------------------------------------  OUT:    Total OUT: 0 ml  ---------------------------------------------  Total NET: 840 ml    I & Os for current day (as of 02 Mar 2017 11:05)  =============================================  IN:    Oral Fluid: 300 ml    Total IN: 300 ml  ---------------------------------------------  OUT:    Total OUT: 0 ml  ---------------------------------------------  Total NET: 300 ml    Daily     Daily     RADIOLOGY & ADDITIONAL STUDIES:

## 2017-03-02 NOTE — DISCHARGE NOTE ADULT - MEDICATION SUMMARY - MEDICATIONS TO TAKE
I will START or STAY ON the medications listed below when I get home from the hospital:    aspirin 81 mg oral tablet  -- 1 tab(s) by mouth once a day hosp MDD:once daily  -- Indication: For CAD (coronary artery disease)    Aspir-Low 81 mg oral delayed release tablet  -- 1 tab(s) by mouth once a day  -- Swallow whole.  Do not crush.  Take with food or milk.    -- Indication: For CAD (coronary artery disease)    lisinopril 20 mg oral tablet  -- 1 tab(s) by mouth once a day  -- Indication: For Htn    Imdur 30 mg oral tablet, extended release  -- 1 tab(s) by mouth once a day (in the morning)  -- Do not drink alcoholic beverages when taking this medication.  It is very important that you take or use this exactly as directed.  Do not skip doses or discontinue unless directed by your doctor.  Swallow whole.  Do not crush.    -- Indication: For CAD (coronary artery disease)    simvastatin 10 mg oral tablet  -- 1 tab(s) by mouth once a day (at bedtime)  -- Avoid grapefruit and grapefruit juice while taking this medication.  Do not take this drug if you are pregnant.  It is very important that you take or use this exactly as directed.  Do not skip doses or discontinue unless directed by your doctor.  Obtain medical advice before taking any non-prescription drugs as some may affect the action of this medication.  Take with food or milk.    -- Indication: For Hld    clopidogrel 75 mg oral tablet  -- 1 tab(s) by mouth once a day  -- Do not take aspirin or aspirin containing products without knowledge and consent of your physician.    -- Indication: For CAD (coronary artery disease)    labetalol 200 mg oral tablet  -- 1 tab(s) by mouth 2 times a day  -- Indication: For Htn    pantoprazole 40 mg oral delayed release tablet  -- 1 tab(s) by mouth once a day  -- It is very important that you take or use this exactly as directed.  Do not skip doses or discontinue unless directed by your doctor.  Obtain medical advice before taking any non-prescription drugs as some may affect the action of this medication.  Swallow whole.  Do not crush.    -- Indication: For gerd

## 2017-03-02 NOTE — DISCHARGE NOTE ADULT - CARE PROVIDER_API CALL
Lazaro Miranda (MD), Cardiovascular Disease; Internal Medicine  [address]  [place] [address]  Phone: [phone]  Fax: [phone]

## 2017-03-06 DIAGNOSIS — I25.118 ATHEROSCLEROTIC HEART DISEASE OF NATIVE CORONARY ARTERY WITH OTHER FORMS OF ANGINA PECTORIS: ICD-10-CM

## 2017-03-06 DIAGNOSIS — R07.89 OTHER CHEST PAIN: ICD-10-CM

## 2017-03-06 DIAGNOSIS — Z95.5 PRESENCE OF CORONARY ANGIOPLASTY IMPLANT AND GRAFT: ICD-10-CM

## 2017-03-06 DIAGNOSIS — Z79.82 LONG TERM (CURRENT) USE OF ASPIRIN: ICD-10-CM

## 2017-03-06 DIAGNOSIS — I10 ESSENTIAL (PRIMARY) HYPERTENSION: ICD-10-CM

## 2017-03-12 PROCEDURE — C1887: CPT

## 2017-03-12 PROCEDURE — 93005 ELECTROCARDIOGRAM TRACING: CPT

## 2017-03-12 PROCEDURE — C1769: CPT

## 2017-03-12 PROCEDURE — C1894: CPT

## 2017-03-12 PROCEDURE — 93458 L HRT ARTERY/VENTRICLE ANGIO: CPT

## 2017-07-22 NOTE — ED ADULT TRIAGE NOTE - NS ED NOTE AC HIGH RISK COUNTRIES
At D/C, AA & O x 3, skin warm and dry, follow up care discussed at length with patient/family to include meds and follow-up with MD; patient/family given discharge instructions along with prescriptions as indicated and care sheets     No

## 2019-10-28 PROBLEM — I25.10 ATHEROSCLEROTIC HEART DISEASE OF NATIVE CORONARY ARTERY WITHOUT ANGINA PECTORIS: Chronic | Status: ACTIVE | Noted: 2017-02-27

## 2019-10-28 PROBLEM — Z95.5 PRESENCE OF CORONARY ANGIOPLASTY IMPLANT AND GRAFT: Chronic | Status: ACTIVE | Noted: 2017-02-27

## 2019-11-25 ENCOUNTER — APPOINTMENT (OUTPATIENT)
Dept: PULMONOLOGY | Facility: CLINIC | Age: 59
End: 2019-11-25

## 2020-11-12 NOTE — ED PROVIDER NOTE - CROS ED SKIN ALL NEG
You can access the FollowMyHealth Patient Portal offered by [facility] by registering at the following website: [email or web address]. By joining [facility]’s FollowMyHealth portal, you will also be able to view your health information using other applications (apps) compatible with our system.
negative...

## 2022-07-27 NOTE — ED PROVIDER NOTE - NS HIV RISK FACTOR YES
----- Message from Silvino Shelley sent at 7/27/2022  9:30 AM CDT -----  Pt is needing a call back regarding a horrible toothache. Pt would like to know if she can get a prescription sent in for the pain. Please call .[phone]      
Routed call to Dr. Regan  
Declined

## 2024-11-19 NOTE — DISCHARGE NOTE ADULT - DISCHARGE DATE
Problem: Adult Inpatient Plan of Care  Goal: Plan of Care Review  Outcome: Progressing  Flowsheets (Taken 11/18/2024 2344)  Plan of Care Reviewed With: patient  Goal: Patient-Specific Goal (Individualized)  Outcome: Progressing  Flowsheets (Taken 11/18/2024 2344)  Individualized Care Needs: Pain Management, PO ABX  Anxieties, Fears or Concerns: Denies  Patient/Family-Specific Goals (Include Timeframe): N/A  Goal: Absence of Hospital-Acquired Illness or Injury  Outcome: Progressing  Goal: Optimal Comfort and Wellbeing  Outcome: Progressing  Goal: Readiness for Transition of Care  Outcome: Progressing     Problem: Diabetes Comorbidity  Goal: Blood Glucose Level Within Targeted Range  Outcome: Progressing     Problem: Infection  Goal: Absence of Infection Signs and Symptoms  Outcome: Progressing     Problem: Wound  Goal: Optimal Coping  Outcome: Progressing  Goal: Optimal Functional Ability  Outcome: Progressing  Goal: Absence of Infection Signs and Symptoms  Outcome: Progressing  Goal: Improved Oral Intake  Outcome: Progressing  Goal: Optimal Pain Control and Function  Outcome: Progressing  Goal: Skin Health and Integrity  Outcome: Progressing  Goal: Optimal Wound Healing  Outcome: Progressing     Problem: Skin Injury Risk Increased  Goal: Skin Health and Integrity  Outcome: Progressing     Problem: Pain Acute  Goal: Optimal Pain Control and Function  Outcome: Progressing     Problem: Fall Injury Risk  Goal: Absence of Fall and Fall-Related Injury  Outcome: Progressing      02-Mar-2017

## 2025-02-03 NOTE — PATIENT PROFILE ADULT. - PRO SERVICES AT [facility]
Continue Regimen: Mometasone 0.1% solution bid x 2 weeks on, 2 weeks off. Detail Level: Detailed unsure